# Patient Record
Sex: FEMALE | Race: WHITE | NOT HISPANIC OR LATINO | Employment: FULL TIME | URBAN - METROPOLITAN AREA
[De-identification: names, ages, dates, MRNs, and addresses within clinical notes are randomized per-mention and may not be internally consistent; named-entity substitution may affect disease eponyms.]

---

## 2017-08-14 ENCOUNTER — TRANSCRIBE ORDERS (OUTPATIENT)
Dept: PHYSICAL THERAPY | Facility: CLINIC | Age: 47
End: 2017-08-14

## 2017-08-14 DIAGNOSIS — S66.911A WRIST STRAIN, RIGHT, INITIAL ENCOUNTER: Primary | ICD-10-CM

## 2017-08-17 ENCOUNTER — TREATMENT (OUTPATIENT)
Dept: PHYSICAL THERAPY | Facility: CLINIC | Age: 47
End: 2017-08-17

## 2017-08-17 DIAGNOSIS — S63.501A SPRAIN OF WRIST, RIGHT, INITIAL ENCOUNTER: Primary | ICD-10-CM

## 2017-08-17 PROCEDURE — 97112 NEUROMUSCULAR REEDUCATION: CPT | Performed by: PHYSICAL THERAPIST

## 2017-08-17 PROCEDURE — 97140 MANUAL THERAPY 1/> REGIONS: CPT | Performed by: PHYSICAL THERAPIST

## 2017-08-17 PROCEDURE — 97161 PT EVAL LOW COMPLEX 20 MIN: CPT | Performed by: PHYSICAL THERAPIST

## 2017-08-17 PROCEDURE — 97035 APP MDLTY 1+ULTRASOUND EA 15: CPT | Performed by: PHYSICAL THERAPIST

## 2017-08-17 NOTE — PROGRESS NOTES
Physical Therapy Initial Evaluation and Plan of Care    Subjective Evaluation    History of Present Illness  Date of onset: 2017  Mechanism of injury: In a tennis match, she hit a artie shot and had pain in dorsal wrist.  She had been experience some discomfort starting about 4 months ago, and had been worsening with time.  She had been wrapping the wrist.  After this incident, very painful to move the wrist at all for 4 days, but not swelling.  Then she was casted for 3 weeks.     Subjective comment: Right Wrist pain  Patient Occupation: Works at key board all day, [employer]. Quality of life: good    Pain  Current pain ratin  At best pain ratin  At worst pain ratin  Location: Intermittent right dorsal wrist pain.  It seems that extension of the long finger can cause pain up to lateral pain, since being the cast.  Now has shoulder pain on the right since being casted. Pain in the right upper trap and scapula.  Quality: knife-like and tight  Relieving factors: rest and support  Aggravating factors: lifting (Supination/pronation, extension.)    Hand dominance: right    Diagnostic Tests  X-ray: normal    Treatments  Previous treatment: immobilization           Objective     Palpation     Right   Hypertonic in the upper trapezius. Tenderness of the upper trapezius.     Tenderness     Right Elbow   Tenderness in the lateral epicondyle and radial head.     Right Wrist/Hand   Tenderness in the lateral epicondyle.     Additional Tenderness Details  Right dorsal lunate area tender.    Neurological Testing     Reflexes   Left   Biceps (C5/C6): normal (2+)  Brachioradialis (C6): normal (2+)  Triceps (C7): normal (2+)    Right   Biceps (C5/C6): normal (2+)  Brachioradialis (C6): normal (2+)  Triceps (C7): normal (2+)    Active Range of Motion     Right Shoulder   Normal active range of motion    Right Elbow   Forearm supination: 90 degrees   Forearm pronation: 90 (pain at dorsal wrist) degrees with  pain    Passive Range of Motion     Left Wrist   Wrist flexion: 80 degrees   Wrist extension: 80 degrees   Radial deviation: 20 degrees   Ulnar deviation: 35 degrees     Right Wrist   Wrist flexion: 61 degrees with pain  Wrist extension: 66 degrees   Radial deviation: 12 degrees   Ulnar deviation: 22 degrees     Strength/Myotome Testing     Right Shoulder   Normal muscle strength    Left Wrist/Hand      (2nd hand position)     Trial 1: 61    Comments: Pain with gripping and pinching    Thumb Strength  Key/Lateral Pinch     Trail 1: 17  Tip/Two-Point Pinch     Trial 1: 12  Palmar/Three-Point Pinch     Trial 1: 14    Right Wrist/Hand   Wrist extension: 5  Wrist flexion: 5  Radial deviation: 5  Ulnar deviation: 5     (2nd hand position)     Trial 1: 62    Thumb Strength   Key/Lateral Pinch     Trial 1: 16  Tip/Two-Point Pinch     Trial 1: 10  Palmar/Three-Point Pinch     Trial 1: 14    Tests     Right Wrist/Hand   Positive resisted middle finger.   Negative distal radial-ulnar joint stress, lunotriquetral shear, Phalen's sign, scapholunate shear and Tinel's sign (medial nerve).          Assessment & Plan     Assessment  Impairments: abnormal muscle firing, abnormal or restricted ROM, activity intolerance, impaired physical strength and pain with function  Assessment details: This pt. presents with a non-work related injury to the right wrist & and forearm area.  Clinically, pt presents with lunate/scaphoid carpal sprain from repeated  activities at home and with recreational sports.  She has developed a mild case of left lateral epicondylitis and an upper trap trigger point.  Neurological exam is normal.  Problems:  pain, tenderness, weakness, decreased , and decreased daily activities.  Prognosis: good  Prognosis details:   GOALS  STG to be met in 3 weeks  1.  Decrease their worst episodes of discomfort to 4/10.  2.  Pt. able to obtain functional AROM of wrist.  3.  Pt. able to  65 lbs without  increasing wrist pain.  LTG to be met in 6 weeks  1.  Patient is able to resume normal activities without restrictions.  2.  Pt. is able to  without pain.  3.  Pt. is independent with all rehab.  4.  Pt. able to apply compression forces through the wrist without pain.  5.  Pt. able to return to tennis  Functional Limitations: carrying objects, lifting, pulling, pushing and uncomfortable because of pain  Plan  Therapy options: will be seen for skilled physical therapy services  Planned modality interventions: cryotherapy, fluidotherapy, high voltage pulsed current (pain management), ultrasound and iontophoresis  Planned therapy interventions: manual therapy, neuromuscular re-education, orthotic fitting/training, soft tissue mobilization, strengthening, stretching, therapeutic activities, joint mobilization, home exercise program, functional ROM exercises, flexibility and fine motor coordination training  Frequency: 2x week  Duration in weeks: 6  Treatment plan discussed with: patient        Manual Therapy:    15     mins  79041;  Therapeutic Exercise:         mins  66488;     Neuromuscular Ruddy:    8    mins  08937;    Therapeutic Activity:          mins  54031;     Gait Training:           mins  33780;     Ultrasound:     21     mins  26365;    Electrical Stimulation:    =     mins  42833 ( );  Dry Needling          mins self-pay    Timed Treatment:   44   mins   Total Treatment:     71   mins    PT SIGNATURE: Chris Moraes, PT   DATE TREATMENT INITIATED: 8/17/2017    Initial Certification  Certification Period: 11/15/2017  I certify that the therapy services are furnished while this patient is under my care.  The services outlined above are required by this patient, and will be reviewed every 90 days.     PHYSICIAN: Yessica Melendez MD      DATE:     Please sign and return via fax to  .. Thank you, [facility].

## 2017-08-23 ENCOUNTER — TREATMENT (OUTPATIENT)
Dept: PHYSICAL THERAPY | Facility: CLINIC | Age: 47
End: 2017-08-23

## 2017-08-23 DIAGNOSIS — S63.501S: Primary | ICD-10-CM

## 2017-08-23 PROCEDURE — 97035 APP MDLTY 1+ULTRASOUND EA 15: CPT | Performed by: PHYSICAL THERAPIST

## 2017-08-23 PROCEDURE — 97033 APP MDLTY 1+IONTPHRSIS EA 15: CPT | Performed by: PHYSICAL THERAPIST

## 2017-08-23 PROCEDURE — 97022 WHIRLPOOL THERAPY: CPT | Performed by: PHYSICAL THERAPIST

## 2017-08-23 PROCEDURE — 97140 MANUAL THERAPY 1/> REGIONS: CPT | Performed by: PHYSICAL THERAPIST

## 2017-08-23 NOTE — PROGRESS NOTES
Physical Therapy Daily Progress Note    Patient: Norma Leone   : 1970  Diagnosis/ICD-10 Code:  Sprain of wrist, right, sequela [S63.501S]  Referring practitioner: Yessica Melendez MD  Date of Initial Visit: Type: THERAPY  Noted: 2017  Today's Date: 2017  Patient seen for 2 sessions                 Subjective   Remembered the pain original started on the ulna side of the hand.  High fived someone Monday and caused a lot of pain.  Objective   PALPATION:  Tender ulna side of the hand.  TEST: TFCC grind test -  ROM:  Right wrist flexion 50 degrees with pain, extension 55 degrees with pain  See Exercise, Manual, and Modality Logs for complete treatment.       Assessment/Plan  UCL maybe, can't rule out TFCC pathology yet.  Progress per Plan of Care           Manual Therapy:    15     mins  23166;  Therapeutic Exercise:         mins  63981;     Neuromuscular Ruddy:        mins  98791;    Therapeutic Activity:          mins  26936;     Gait Training:           mins  34612;     Ultrasound:     13     mins  59797;    Electrical Stimulation:         mins  44074 ( );  Dry Needling          mins self-pay    Timed Treatment:   43   mins   Total Treatment:     58   mins    Chris Moraes, ZULEIMA  Physical Therapist

## 2017-08-25 ENCOUNTER — TREATMENT (OUTPATIENT)
Dept: PHYSICAL THERAPY | Facility: CLINIC | Age: 47
End: 2017-08-25

## 2017-08-25 DIAGNOSIS — S63.501S: Primary | ICD-10-CM

## 2017-08-25 PROCEDURE — 97140 MANUAL THERAPY 1/> REGIONS: CPT | Performed by: PHYSICAL THERAPIST

## 2017-08-25 PROCEDURE — 97035 APP MDLTY 1+ULTRASOUND EA 15: CPT | Performed by: PHYSICAL THERAPIST

## 2017-08-25 PROCEDURE — 97110 THERAPEUTIC EXERCISES: CPT | Performed by: PHYSICAL THERAPIST

## 2017-08-25 NOTE — PROGRESS NOTES
Subjective   Norma Leone reports: the ionto helped some. Today pain is along the extensor tendon.    Objective   PALPATION:  TFCC .  AROM:  Right wrist flexion 60, extension 65 with pain.  See Exercise, Manual, and Modality Logs for complete treatment.       Assessment/Plan  TFCC pathology makes more sense with her history of pain  Progress per Plan of Care           Manual Therapy:    15     mins  67748;  Therapeutic Exercise:    15     mins  63757;     Neuromuscular Ruddy:        mins  17558;    Therapeutic Activity:          mins  92711;     Gait Training:           mins  00148;     Ultrasound:     15     mins  75667;   Iontophoresis          mins  56313   Electrical Stimulation:         mins  21989 ( );  Dry Needling          mins self-pay  Fluidotherapy          mins 32616    Timed Treatment:   45   mins   Total Treatment:     45   mins    Chris Moraes, PT  Physical Therapist

## 2017-08-29 ENCOUNTER — TREATMENT (OUTPATIENT)
Dept: PHYSICAL THERAPY | Facility: CLINIC | Age: 47
End: 2017-08-29

## 2017-08-29 DIAGNOSIS — S63.501S: Primary | ICD-10-CM

## 2017-08-29 PROCEDURE — 97018 PARAFFIN BATH THERAPY: CPT | Performed by: PHYSICAL THERAPIST

## 2017-08-29 PROCEDURE — 97014 ELECTRIC STIMULATION THERAPY: CPT | Performed by: PHYSICAL THERAPIST

## 2017-08-29 PROCEDURE — 97035 APP MDLTY 1+ULTRASOUND EA 15: CPT | Performed by: PHYSICAL THERAPIST

## 2017-08-29 PROCEDURE — 97140 MANUAL THERAPY 1/> REGIONS: CPT | Performed by: PHYSICAL THERAPIST

## 2017-08-30 NOTE — PROGRESS NOTES
Subjective   Norma Leone reports: The HEP causes a popping pain in the wrist, and she had a reaction to the tape adhesive    Objective   OBSERVATION:  Minimal edema  PALPATION:  Lunate and TFCC area  See Exercise, Manual, and Modality Logs for complete treatment.       Assessment/Plan  ROM is WNL, but pain persist.  Progress per Plan of Care and Progress strengthening /stabilization /functional activity           Manual Therapy:    20     mins  21225;  Therapeutic Exercise:         mins  22938;     Neuromuscular Ruddy:        mins  95944;    Therapeutic Activity:          mins  30899;     Gait Training:           mins  44213;     Ultrasound:     15     mins  69915;   Iontophoresis          mins  47720   Electrical Stimulation:    20     mins  43300 (MC );  Dry Needling          mins self-pay  Fluidotherapy          mins 47843  Paraffin    15  mins    Timed Treatment:   35   mins   Total Treatment:     70   mins    Chris Moraes, PT  Physical Therapist

## 2017-08-31 ENCOUNTER — TREATMENT (OUTPATIENT)
Dept: PHYSICAL THERAPY | Facility: CLINIC | Age: 47
End: 2017-08-31

## 2017-08-31 DIAGNOSIS — S63.501S: Primary | ICD-10-CM

## 2017-08-31 PROCEDURE — 97014 ELECTRIC STIMULATION THERAPY: CPT | Performed by: PHYSICAL THERAPIST

## 2017-08-31 PROCEDURE — 97035 APP MDLTY 1+ULTRASOUND EA 15: CPT | Performed by: PHYSICAL THERAPIST

## 2017-08-31 PROCEDURE — 97140 MANUAL THERAPY 1/> REGIONS: CPT | Performed by: PHYSICAL THERAPIST

## 2017-08-31 PROCEDURE — 97110 THERAPEUTIC EXERCISES: CPT | Performed by: PHYSICAL THERAPIST

## 2017-09-07 ENCOUNTER — TREATMENT (OUTPATIENT)
Dept: PHYSICAL THERAPY | Facility: CLINIC | Age: 47
End: 2017-09-07

## 2017-09-07 DIAGNOSIS — S63.501S: Primary | ICD-10-CM

## 2017-09-07 PROCEDURE — 97033 APP MDLTY 1+IONTPHRSIS EA 15: CPT | Performed by: PHYSICAL THERAPIST

## 2017-09-07 PROCEDURE — 97110 THERAPEUTIC EXERCISES: CPT | Performed by: PHYSICAL THERAPIST

## 2017-09-07 PROCEDURE — 97140 MANUAL THERAPY 1/> REGIONS: CPT | Performed by: PHYSICAL THERAPIST

## 2017-09-08 NOTE — PROGRESS NOTES
Physical Therapy Daily Progress Note        Patient: Norma Leone   : 1970  Diagnosis/ICD-10 Code:  Sprain of wrist, right, sequela [S63.501S]  Referring practitioner: Yessica Melendez MD  Date of Initial Visit: Type: THERAPY  Noted: 2017  Today's Date: 2017  Patient seen for 6 sessions           Subjective   Norma Leone reports: The wrist is feeling some better, but the lateral right elbow is still very painful    Objective   TEST:  Tennis elbow test +  PALPATION:  Right lateral elbow lateral epicondyle is tender.  See Exercise, Manual, and Modality Logs for complete treatment.       Assessment/Plan  Some lateral epicondylitis persist.  Progress per Plan of Care           Manual Therapy:    25     mins  88074;  Therapeutic Exercise:    10     mins  98072;     Neuromuscular Ruddy:        mins  87824;    Therapeutic Activity:          mins  99340;     Gait Training:           mins  54044;     Ultrasound:     15     mins  38252;    Electrical Stimulation:         mins  44150 ( );  Fluidotherapy:          mins  30286  Traction:          mins  35861  Dry Needling          mins self-pay  Iontophoresis    10  mins      Timed Treatment:   60   mins   Total Treatment:     60   mins    Chris Moraes PT  Physical Therapist

## 2017-09-12 ENCOUNTER — TREATMENT (OUTPATIENT)
Dept: PHYSICAL THERAPY | Facility: CLINIC | Age: 47
End: 2017-09-12

## 2017-09-12 DIAGNOSIS — S63.501S: Primary | ICD-10-CM

## 2017-09-12 PROCEDURE — 97140 MANUAL THERAPY 1/> REGIONS: CPT | Performed by: PHYSICAL THERAPIST

## 2017-09-12 PROCEDURE — 97014 ELECTRIC STIMULATION THERAPY: CPT | Performed by: PHYSICAL THERAPIST

## 2017-09-12 PROCEDURE — 97035 APP MDLTY 1+ULTRASOUND EA 15: CPT | Performed by: PHYSICAL THERAPIST

## 2017-09-15 ENCOUNTER — TREATMENT (OUTPATIENT)
Dept: PHYSICAL THERAPY | Facility: CLINIC | Age: 47
End: 2017-09-15

## 2017-09-15 DIAGNOSIS — S63.501S: Primary | ICD-10-CM

## 2017-09-15 PROCEDURE — 97014 ELECTRIC STIMULATION THERAPY: CPT | Performed by: PHYSICAL THERAPIST

## 2017-09-15 PROCEDURE — 97035 APP MDLTY 1+ULTRASOUND EA 15: CPT | Performed by: PHYSICAL THERAPIST

## 2017-09-15 PROCEDURE — 97140 MANUAL THERAPY 1/> REGIONS: CPT | Performed by: PHYSICAL THERAPIST

## 2017-09-15 NOTE — PROGRESS NOTES
Subjective   Norma Deannter reports: wrist still doing some better, but lateral elbow still very painful    Objective   PALPATION:  Right lateral elbow tender  TEST:  Tennis Elbow test +  ROM:  WNL  See Exercise, Manual, and Modality Logs for complete treatment.       Assessment/Plan  TFCC symptoms not as severe, lateral epicondylitis symptoms persist.  Progress strengthening /stabilization /functional activity           Manual Therapy:    20     mins  74839;  Therapeutic Exercise:    5     mins  89497;     Neuromuscular Ruddy:        mins  91329;    Therapeutic Activity:          mins  91615;     Gait Training:           mins  44679;     Ultrasound:     15     mins  46183;   Iontophoresis          mins  22518   Electrical Stimulation:    20     mins  53177 ( );  Dry Needling          mins self-pay  Fluidotherapy          mins 19045    Timed Treatment:   40   mins   Total Treatment:     60   mins    Chris Moraes, PT  Physical Therapist

## 2017-09-18 NOTE — PROGRESS NOTES
Physical Therapy Daily Progress Note        Patient: Norma Leone   : 1970  Diagnosis/ICD-10 Code:  Sprain of wrist, right, sequela [S63.501S]  Referring practitioner: Yessica Melendez MD  Date of Initial Visit: Type: THERAPY  Noted: 2017  Today's Date: 9/15/2017  Patient seen for 8 sessions           Subjective   Norma Leone reports: Wrist is improving some, MD is ordering an MRI.  Right lateral elbow still painful    Objective   PALPATION:  Right lateral Elbow tender.  ROM:  UE is WNL  See Exercise, Manual, and Modality Logs for complete treatment.       Assessment/Plan  Suspect TFCC issue and right lateral epicondylitis  Progress per Plan of Care           Manual Therapy:    10     mins  65403;  Therapeutic Exercise:         mins  33574;     Neuromuscular Ruddy:        mins  99442;    Therapeutic Activity:          mins  83078;     Gait Training:           mins  52640;     Ultrasound:     15     mins  95330;    Electrical Stimulation:    20     mins  97804 ( );  Fluidotherapy:          mins  79355  Traction:          mins  27367  Dry Needling          mins self-pay    Timed Treatment:   25   mins   Total Treatment:     45   mins    Chris Moraes PT  Physical Therapist

## 2017-09-19 ENCOUNTER — TREATMENT (OUTPATIENT)
Dept: PHYSICAL THERAPY | Facility: CLINIC | Age: 47
End: 2017-09-19

## 2017-09-19 ENCOUNTER — TRANSCRIBE ORDERS (OUTPATIENT)
Dept: ADMINISTRATIVE | Facility: HOSPITAL | Age: 47
End: 2017-09-19

## 2017-09-19 DIAGNOSIS — S63.501S: Primary | ICD-10-CM

## 2017-09-19 DIAGNOSIS — M25.531 RIGHT WRIST PAIN: Primary | ICD-10-CM

## 2017-09-19 PROCEDURE — 97033 APP MDLTY 1+IONTPHRSIS EA 15: CPT | Performed by: PHYSICAL THERAPIST

## 2017-09-19 PROCEDURE — 97140 MANUAL THERAPY 1/> REGIONS: CPT | Performed by: PHYSICAL THERAPIST

## 2017-09-19 PROCEDURE — 97110 THERAPEUTIC EXERCISES: CPT | Performed by: PHYSICAL THERAPIST

## 2017-09-19 NOTE — PROGRESS NOTES
Physical Therapy Daily Progress Note        Patient: Norma Leone   : 1970  Diagnosis/ICD-10 Code:  Sprain of wrist, right, sequela [S63.501S]  Referring practitioner: Yessica Melendez MD  Date of Initial Visit: Type: THERAPY  Noted: 2017  Today's Date: 2017  Patient seen for 9 sessions           Subjective   Norma Leone reports: The wrist is not as bad, but the lateral elbow is no better.    Objective   PALPATION:  Tender right lateral epicondyle  ROM: Right UE WNL.  OTHER:  Stop all HEP except for stretching.    See Exercise, Manual, and Modality Logs for complete treatment.       Assessment/Plan  Lateral epicondylitis still present, the TFCC symptoms area not as severe.  Progress per Plan of Care           Manual Therapy:    20     mins  79479;  Therapeutic Exercise:    8     mins  62304;     Neuromuscular Ruddy:        mins  58782;    Therapeutic Activity:          mins  64415;     Gait Training:           mins  24084;     Ultrasound:     15     mins  39814;   Iontophoresis    25__   mins 96806  Electrical Stimulation:         mins  61009 ( );  Fluidotherapy:          mins  15732  Traction:          mins  91767  Dry Needling          mins self-pay    Timed Treatment:   68   mins   Total Treatment:     68   mins    Chris Moraes PT  Physical Therapist

## 2017-09-22 ENCOUNTER — HOSPITAL ENCOUNTER (OUTPATIENT)
Dept: MRI IMAGING | Facility: HOSPITAL | Age: 47
Discharge: HOME | End: 2017-09-22
Attending: PLASTIC SURGERY | Admitting: PLASTIC SURGERY

## 2017-09-22 DIAGNOSIS — M25.531 RIGHT WRIST PAIN: ICD-10-CM

## 2017-09-22 PROCEDURE — 73221 MRI JOINT UPR EXTREM W/O DYE: CPT

## 2017-09-26 ENCOUNTER — TREATMENT (OUTPATIENT)
Dept: PHYSICAL THERAPY | Facility: CLINIC | Age: 47
End: 2017-09-26

## 2017-09-26 DIAGNOSIS — S63.501S: Primary | ICD-10-CM

## 2017-09-26 PROCEDURE — 97110 THERAPEUTIC EXERCISES: CPT | Performed by: PHYSICAL THERAPIST

## 2017-09-26 PROCEDURE — 97035 APP MDLTY 1+ULTRASOUND EA 15: CPT | Performed by: PHYSICAL THERAPIST

## 2017-09-26 PROCEDURE — 97140 MANUAL THERAPY 1/> REGIONS: CPT | Performed by: PHYSICAL THERAPIST

## 2017-09-27 NOTE — PROGRESS NOTES
Subjective   Norma Leone reports: MRI of wrist is normal, received an injection in the lateral elbow    Objective   PALPATION:  TFCC .Right anterior GH joint tender  AROM:  Right wrist flexion 75, extension 70 with pain.  SHOULDER EXAM: Speed's test Positive  See Exercise, Manual, and Modality Logs for complete treatment.       Assessment/Plan  TFCC pathology makes more sense with her history of pain  Progress per Plan of Care           Manual Therapy:    30     mins  25514;  Therapeutic Exercise:    8     mins  83807;     Neuromuscular Ruddy:        mins  97824;    Therapeutic Activity:          mins  03175;     Gait Training:           mins  59239;     Ultrasound:     15     mins  19012;   Iontophoresis          mins  56072   Electrical Stimulation:         mins  09870 ( );  Dry Needling          mins self-pay  Fluidotherapy          mins 74776    Timed Treatment:   53   mins   Total Treatment:     53   mins    Chris Moraes, PT, CHT  Physical Therapist

## 2019-02-06 ENCOUNTER — AMBULATORY SURGICAL CENTER (OUTPATIENT)
Dept: URBAN - METROPOLITAN AREA SURGERY 10 | Facility: SURGERY | Age: 49
End: 2019-02-06

## 2019-02-06 DIAGNOSIS — K64.0 FIRST DEGREE HEMORRHOIDS: ICD-10-CM

## 2019-02-06 DIAGNOSIS — Z12.11 ENCOUNTER FOR SCREENING FOR MALIGNANT NEOPLASM OF COLON: ICD-10-CM

## 2019-02-06 PROCEDURE — 45378 DIAGNOSTIC COLONOSCOPY: CPT | Mod: 33 | Performed by: INTERNAL MEDICINE

## 2019-08-20 ENCOUNTER — TELEPHONE (OUTPATIENT)
Dept: MRI IMAGING | Facility: HOSPITAL | Age: 49
End: 2019-08-20

## 2019-08-20 NOTE — TELEPHONE ENCOUNTER
Pt has an MRI Breast order that cannot be scheduled without a MMG first. Pt states that her pain is such that she cannot tolerate a MMG.  She has decided to go elsewhere where they do not require a recent MMG. Her last one was in 2014. Pt understood our recommendations and was encouraged to call back if she decides to continue with our recommendations.

## 2022-01-25 ENCOUNTER — TRANSCRIBE ORDERS (OUTPATIENT)
Dept: LAB | Facility: HOSPITAL | Age: 52
End: 2022-01-25

## 2022-01-25 ENCOUNTER — LAB (OUTPATIENT)
Dept: LAB | Facility: HOSPITAL | Age: 52
End: 2022-01-25

## 2022-01-25 DIAGNOSIS — Z79.890 NEED FOR PROPHYLACTIC HORMONE REPLACEMENT THERAPY (POSTMENOPAUSAL): ICD-10-CM

## 2022-01-25 DIAGNOSIS — Z79.890 NEED FOR PROPHYLACTIC HORMONE REPLACEMENT THERAPY (POSTMENOPAUSAL): Primary | ICD-10-CM

## 2022-01-25 LAB
DEPRECATED RDW RBC AUTO: 41.5 FL (ref 37–54)
ERYTHROCYTE [DISTWIDTH] IN BLOOD BY AUTOMATED COUNT: 13 % (ref 12.3–15.4)
HCT VFR BLD AUTO: 40 % (ref 34–46.6)
HGB BLD-MCNC: 13.2 G/DL (ref 12–15.9)
MCH RBC QN AUTO: 29 PG (ref 26.6–33)
MCHC RBC AUTO-ENTMCNC: 33 G/DL (ref 31.5–35.7)
MCV RBC AUTO: 87.9 FL (ref 79–97)
PLATELET # BLD AUTO: 285 10*3/MM3 (ref 140–450)
PMV BLD AUTO: 10.5 FL (ref 6–12)
RBC # BLD AUTO: 4.55 10*6/MM3 (ref 3.77–5.28)
WBC NRBC COR # BLD: 7.6 10*3/MM3 (ref 3.4–10.8)

## 2022-01-25 PROCEDURE — 84144 ASSAY OF PROGESTERONE: CPT | Performed by: OBSTETRICS & GYNECOLOGY

## 2022-01-25 PROCEDURE — 82306 VITAMIN D 25 HYDROXY: CPT | Performed by: OBSTETRICS & GYNECOLOGY

## 2022-01-25 PROCEDURE — 82670 ASSAY OF TOTAL ESTRADIOL: CPT | Performed by: OBSTETRICS & GYNECOLOGY

## 2022-01-25 PROCEDURE — 80050 GENERAL HEALTH PANEL: CPT | Performed by: OBSTETRICS & GYNECOLOGY

## 2022-01-25 PROCEDURE — 84403 ASSAY OF TOTAL TESTOSTERONE: CPT

## 2022-01-25 PROCEDURE — 36415 COLL VENOUS BLD VENIPUNCTURE: CPT | Performed by: OBSTETRICS & GYNECOLOGY

## 2022-01-26 LAB
25(OH)D3 SERPL-MCNC: 78.4 NG/ML (ref 30–100)
ALBUMIN SERPL-MCNC: 4.7 G/DL (ref 3.5–5.2)
ALBUMIN/GLOB SERPL: 2 G/DL
ALP SERPL-CCNC: 80 U/L (ref 39–117)
ALT SERPL W P-5'-P-CCNC: 12 U/L (ref 1–33)
ANION GAP SERPL CALCULATED.3IONS-SCNC: 6.2 MMOL/L (ref 5–15)
AST SERPL-CCNC: 22 U/L (ref 1–32)
BILIRUB SERPL-MCNC: 0.3 MG/DL (ref 0–1.2)
BUN SERPL-MCNC: 9 MG/DL (ref 6–20)
BUN/CREAT SERPL: 9.9 (ref 7–25)
CALCIUM SPEC-SCNC: 9.7 MG/DL (ref 8.6–10.5)
CHLORIDE SERPL-SCNC: 96 MMOL/L (ref 98–107)
CO2 SERPL-SCNC: 32.8 MMOL/L (ref 22–29)
CREAT SERPL-MCNC: 0.91 MG/DL (ref 0.57–1)
ESTRADIOL SERPL HS-MCNC: 78.1 PG/ML
GFR SERPL CREATININE-BSD FRML MDRD: 65 ML/MIN/1.73
GLOBULIN UR ELPH-MCNC: 2.3 GM/DL
GLUCOSE SERPL-MCNC: 112 MG/DL (ref 65–99)
POTASSIUM SERPL-SCNC: 4.1 MMOL/L (ref 3.5–5.2)
PROGEST SERPL-MCNC: 0.06 NG/ML
PROT SERPL-MCNC: 7 G/DL (ref 6–8.5)
SODIUM SERPL-SCNC: 135 MMOL/L (ref 136–145)
TESTOST SERPL-MCNC: <2.5 NG/DL (ref 2.9–40.8)
TSH SERPL DL<=0.05 MIU/L-ACNC: 1.42 UIU/ML (ref 0.27–4.2)

## 2023-03-21 ENCOUNTER — TRANSCRIBE ORDERS (OUTPATIENT)
Dept: ADMINISTRATIVE | Facility: HOSPITAL | Age: 53
End: 2023-03-21
Payer: COMMERCIAL

## 2023-03-21 DIAGNOSIS — N64.4 BREAST PAIN, RIGHT: Primary | ICD-10-CM

## 2023-03-24 ENCOUNTER — TRANSCRIBE ORDERS (OUTPATIENT)
Dept: ADMINISTRATIVE | Facility: HOSPITAL | Age: 53
End: 2023-03-24
Payer: COMMERCIAL

## 2023-03-24 DIAGNOSIS — Z12.31 ENCOUNTER FOR MAMMOGRAM TO ESTABLISH BASELINE MAMMOGRAM: Primary | ICD-10-CM

## 2023-04-10 ENCOUNTER — HOSPITAL ENCOUNTER (OUTPATIENT)
Dept: ULTRASOUND IMAGING | Facility: HOSPITAL | Age: 53
Discharge: HOME | End: 2023-04-10
Payer: COMMERCIAL

## 2023-04-10 ENCOUNTER — HOSPITAL ENCOUNTER (OUTPATIENT)
Dept: MAMMOGRAPHY | Facility: HOSPITAL | Age: 53
Discharge: HOME | End: 2023-04-10
Payer: COMMERCIAL

## 2023-04-10 DIAGNOSIS — N64.4 BREAST PAIN, RIGHT: ICD-10-CM

## 2023-04-10 PROCEDURE — 77062 BREAST TOMOSYNTHESIS BI: CPT | Performed by: RADIOLOGY

## 2023-04-10 PROCEDURE — 77066 DX MAMMO INCL CAD BI: CPT | Performed by: RADIOLOGY

## 2023-04-10 PROCEDURE — 77066 DX MAMMO INCL CAD BI: CPT

## 2023-04-10 PROCEDURE — 76642 ULTRASOUND BREAST LIMITED: CPT

## 2023-04-10 PROCEDURE — 76642 ULTRASOUND BREAST LIMITED: CPT | Performed by: RADIOLOGY

## 2023-04-10 PROCEDURE — G0279 TOMOSYNTHESIS, MAMMO: HCPCS

## 2023-04-17 ENCOUNTER — OFFICE (OUTPATIENT)
Dept: URBAN - METROPOLITAN AREA CLINIC 4 | Facility: CLINIC | Age: 53
End: 2023-04-17

## 2023-04-17 VITALS
DIASTOLIC BLOOD PRESSURE: 62 MMHG | WEIGHT: 137 LBS | DIASTOLIC BLOOD PRESSURE: 45 MMHG | HEIGHT: 64 IN | SYSTOLIC BLOOD PRESSURE: 100 MMHG | SYSTOLIC BLOOD PRESSURE: 94 MMHG

## 2023-04-17 DIAGNOSIS — R14.3 FLATULENCE: ICD-10-CM

## 2023-04-17 DIAGNOSIS — K59.09 OTHER CONSTIPATION: ICD-10-CM

## 2023-04-17 DIAGNOSIS — R63.0 ANOREXIA: ICD-10-CM

## 2023-04-17 DIAGNOSIS — R19.5 OTHER FECAL ABNORMALITIES: ICD-10-CM

## 2023-04-17 PROCEDURE — 99204 OFFICE O/P NEW MOD 45 MIN: CPT | Performed by: NURSE PRACTITIONER

## 2023-06-12 ENCOUNTER — OFFICE (OUTPATIENT)
Dept: URBAN - METROPOLITAN AREA CLINIC 4 | Facility: CLINIC | Age: 53
End: 2023-06-12

## 2023-06-12 VITALS — DIASTOLIC BLOOD PRESSURE: 48 MMHG | WEIGHT: 137 LBS | SYSTOLIC BLOOD PRESSURE: 107 MMHG | HEIGHT: 64 IN

## 2023-06-12 DIAGNOSIS — R19.5 OTHER FECAL ABNORMALITIES: ICD-10-CM

## 2023-06-12 DIAGNOSIS — R63.0 ANOREXIA: ICD-10-CM

## 2023-06-12 DIAGNOSIS — K59.09 OTHER CONSTIPATION: ICD-10-CM

## 2023-06-12 DIAGNOSIS — K62.89 OTHER SPECIFIED DISEASES OF ANUS AND RECTUM: ICD-10-CM

## 2023-06-12 DIAGNOSIS — R63.4 ABNORMAL WEIGHT LOSS: ICD-10-CM

## 2023-06-12 PROCEDURE — 99213 OFFICE O/P EST LOW 20 MIN: CPT | Performed by: NURSE PRACTITIONER
